# Patient Record
Sex: FEMALE | Race: WHITE | Employment: UNEMPLOYED | URBAN - METROPOLITAN AREA
[De-identification: names, ages, dates, MRNs, and addresses within clinical notes are randomized per-mention and may not be internally consistent; named-entity substitution may affect disease eponyms.]

---

## 2023-06-19 ENCOUNTER — OFFICE VISIT (OUTPATIENT)
Dept: ORTHOPEDIC SURGERY | Age: 78
End: 2023-06-19
Payer: MEDICARE

## 2023-06-19 VITALS — HEIGHT: 63 IN | BODY MASS INDEX: 20.38 KG/M2 | WEIGHT: 115 LBS

## 2023-06-19 DIAGNOSIS — M54.16 LUMBAR RADICULITIS: ICD-10-CM

## 2023-06-19 DIAGNOSIS — G89.29 CHRONIC BILATERAL LOW BACK PAIN WITH LEFT-SIDED SCIATICA: ICD-10-CM

## 2023-06-19 DIAGNOSIS — M54.42 CHRONIC BILATERAL LOW BACK PAIN WITH LEFT-SIDED SCIATICA: ICD-10-CM

## 2023-06-19 DIAGNOSIS — M51.36 DDD (DEGENERATIVE DISC DISEASE), LUMBAR: Primary | ICD-10-CM

## 2023-06-19 PROCEDURE — 99204 OFFICE O/P NEW MOD 45 MIN: CPT | Performed by: PHYSICIAN ASSISTANT

## 2023-06-19 PROCEDURE — 1123F ACP DISCUSS/DSCN MKR DOCD: CPT | Performed by: PHYSICIAN ASSISTANT

## 2023-06-19 RX ORDER — MELOXICAM 15 MG/1
15 TABLET ORAL DAILY PRN
Qty: 30 TABLET | Refills: 0 | Status: SHIPPED | OUTPATIENT
Start: 2023-06-19

## 2023-06-19 NOTE — PROGRESS NOTES
New Patient: Telma Choi    6/19/2023     CHIEF COMPLAINT:    Chief Complaint   Patient presents with    New Patient     LUMBAR/REF BY FRIEND       HISTORY OF PRESENT ILLNESS:              The patient is a 69 y.o. female history of breast cancer, depression, chronic low back pain here for worsening low back and radiating left leg pain. She reports chronic aching constant low back pain radiating into the left buttock, posterior thigh at times to the calf. Her left low back and buttock are most bothersome. Her pain is constant but increased with sitting, walking or resting. She reports some relief with changing position and standing. Conservative care includes physical therapy, NSAIDs, [facility], lumbar radiofrequency neurotomy without benefits. She denies any improvement feels symptoms are worsening. She currently denies any contralateral radiating pain. She denies any lower extremity numbness tingling or progressive weakness. No recent bowel or bladder dysfunction or saddle anesthesia. Currently denies any recent fevers chills or infections. No recent injury. The pain assessment was noted & reviewed in the medical record today.      Current/Past Treatment:   Physical Therapy: Yes  Chiropractic:     Injection:   Radiofrequency neurotomy with Dr. Manju Herrera  Medications:            NSAIDS: Yes            Muscle relaxer:              Steriods:              Neuropathic medications:              Opioids:            Other: Medicinal marijuana   Surgery/Consult:    Work Status/Functionality: Retired    Past Medical History: Medical history form was reviewed today & scanned into the media tab  Past Medical History:   Diagnosis Date    Cancer ([address]     breast    Chronic low back pain     Depression     Migraine     UTI     Vaginal infection     Wears glasses       Past Surgical History:     Past Surgical History:   Procedure Laterality Date    BREAST SURGERY      double mastectomy    EYE

## 2023-06-20 ENCOUNTER — TELEPHONE (OUTPATIENT)
Dept: ORTHOPEDIC SURGERY | Age: 78
End: 2023-06-20

## 2023-06-20 NOTE — TELEPHONE ENCOUNTER
Patient contacted regarding Lumbar MRI WO CONTRAST approval and authorization is valid. Patient was notified and instructed to call to schedule at [address] JOS[address] MIGUEL Valencia  Tel: [phone]      Once completed, patient was instructed on scheduling a follow up appoint to review results.  TEST RESULTS WILL NOT BE GIVEN OVER THE PHONE. AN IN-OFFICE APPOINTMENT IS REQUIRED

## 2023-06-23 ENCOUNTER — HOSPITAL ENCOUNTER (OUTPATIENT)
Dept: PHYSICAL THERAPY | Age: 78
Setting detail: THERAPIES SERIES
Discharge: HOME | End: 2023-06-23
Payer: MEDICARE

## 2023-06-23 PROCEDURE — 97161 PT EVAL LOW COMPLEX 20 MIN: CPT

## 2023-06-23 PROCEDURE — 97112 NEUROMUSCULAR REEDUCATION: CPT

## 2023-06-23 PROCEDURE — 97110 THERAPEUTIC EXERCISES: CPT

## 2023-06-23 NOTE — PROGRESS NOTES
[address] [place] [address] and [facility], [address], [address] [place] [address] [address]  Phone: [phone]   Fax: [phone]    Date: 2023          Patient Name; :  Anca Ornelas; 1945   Dx: Diagnosis: M51.36 (ICD-10-CM) - DDD (degenerative disc disease), lumbar  M54.16 (ICD-10-CM) - Lumbar radiculitis  M54.42, G89.29 (ICD-10-CM) - Chronic bilateral low back pain with left-sided sciatica      Physician:  Fred Martinez PA-C        Total PT Visits:      Measures Previous Current   Pain (0-10)     Disability %     ROM               Strength                 Specific Functional Improvements & Impressions:      Plan & Recommendations:  [] Continue rehabilitation due to objective improvement and continued functional deficits with frequency and duration:  [] Progress toward  []GAP, []Work Conditioning, []Independent HEP   [] Discharge due to   [] All goals achieved, [] Maximized \"medical necessity\" [] No subjective or objective improvements      Electronically signed by:  Fani Mckeon, PT  Therapy Plan of Care Re-Certification  This patient has been re-evaluated for physical therapy services and for therapy to continue, Medicare, Medicaid and other insurances require periodic physician review of the treatment plan. Please review the above re-evaluation and verify that you agree with plan of care as established above by signing the attached document and return it to our office or note changes to established plan below  [] Follow treatment plan as above [] Discontinue physical therapy  [] Change plan to:                                 __________________________________________________    Physician Signature:____________________________________ Date:____________  By signing above, therapists plan is approved by physician    If you have any questions or concerns, please don't hesitate to call.   Thank you for your referral.

## 2023-06-23 NOTE — FLOWSHEET NOTE
visit [] Discharge    Electronically signed by: Nishant Garcia, SPT; Yaniv Palma PT    Note: If patient does not return for scheduled/ recommended follow up visits, this note will serve as a discharge from care along with most recent update on progress.

## 2023-06-23 NOTE — PLAN OF CARE
[]Congenital spine pathologies   []Prior surgical intervention  [x]Osteoporosis (M81.8)  []Osteopenia (M85.8)   Endocrine conditions   []Hypothyroid (E03.9)  []Hyperthyroid Gastrointestinal conditions   []Constipation (Y69.68)   Metabolic conditions   []Morbid obesity (E66.01)  []Diabetes type 1(E10.65) or 2 (E11.65)   []Neuropathy (G60.9)     Pulmonary conditions   []Asthma (J45)  []Coughing   []COPD (J44.9)   Psychological Disorders  []Anxiety (F41.9)  []Depression (F32.9)   []Other:   []Other:           Barriers to/and or personal factors that will affect rehab potential:              []Age  []Sex              []Motivation/Lack of Motivation                        [x]Co-Morbidities              []Cognitive Function, education/learning barriers              []Environmental, home barriers              []profession/work barriers  []past PT/medical experience  []other:  Justification:     Falls Risk Assessment (30 days):   [x] Falls Risk assessed and no intervention required. [] Falls Risk assessed and Patient requires intervention due to being higher risk   TUG score (>12s at risk):     [] Falls education provided, including       G-Codes:       ASSESSMENT: Pt arrives to OPPT with 4/10 SIJ pain L>R which radiates to L gluteal region. Pt tender to palpation over L SIJ, (+) jasper finger sign. Pt's sxs reproduced when completing supine sciatic n glide. With v/c pt is independent with completing therex, in cognitive stage of motor learning. Pt demonstrates few compensatory movements while completing interventions this date. Pt's sxs and pain rating not increased after completing interventions this date.        Functional Impairments:     []Noted lumbar/proximal hip hypomobility   []Noted lumbosacral and/or generalized hypermobility   []Decreased Lumbosacral/hip/LE functional ROM   [x]Decreased core/proximal hip strength and neuromuscular control    []Decreased LE functional strength    []Abnormal

## 2023-06-27 ENCOUNTER — HOSPITAL ENCOUNTER (OUTPATIENT)
Dept: MRI IMAGING | Age: 78
Discharge: HOME | End: 2023-06-27
Payer: MEDICARE

## 2023-06-27 DIAGNOSIS — M54.42 CHRONIC BILATERAL LOW BACK PAIN WITH LEFT-SIDED SCIATICA: ICD-10-CM

## 2023-06-27 DIAGNOSIS — G89.29 CHRONIC BILATERAL LOW BACK PAIN WITH LEFT-SIDED SCIATICA: ICD-10-CM

## 2023-06-27 DIAGNOSIS — M54.16 LUMBAR RADICULITIS: ICD-10-CM

## 2023-06-27 DIAGNOSIS — M51.36 DDD (DEGENERATIVE DISC DISEASE), LUMBAR: ICD-10-CM

## 2023-06-27 PROCEDURE — 72148 MRI LUMBAR SPINE W/O DYE: CPT

## 2023-06-29 ENCOUNTER — HOSPITAL ENCOUNTER (OUTPATIENT)
Dept: PHYSICAL THERAPY | Age: 78
Setting detail: THERAPIES SERIES
Discharge: HOME | End: 2023-06-29
Payer: MEDICARE

## 2023-06-29 PROCEDURE — 97110 THERAPEUTIC EXERCISES: CPT

## 2023-06-29 PROCEDURE — 97112 NEUROMUSCULAR REEDUCATION: CPT

## 2023-07-05 ENCOUNTER — OFFICE VISIT (OUTPATIENT)
Dept: ORTHOPEDIC SURGERY | Age: 78
End: 2023-07-05
Payer: MEDICARE

## 2023-07-05 VITALS — WEIGHT: 117 LBS | HEIGHT: 63 IN | BODY MASS INDEX: 20.73 KG/M2

## 2023-07-05 DIAGNOSIS — G89.29 CHRONIC BILATERAL LOW BACK PAIN WITH LEFT-SIDED SCIATICA: ICD-10-CM

## 2023-07-05 DIAGNOSIS — M54.42 CHRONIC BILATERAL LOW BACK PAIN WITH LEFT-SIDED SCIATICA: ICD-10-CM

## 2023-07-05 DIAGNOSIS — M51.36 DDD (DEGENERATIVE DISC DISEASE), LUMBAR: ICD-10-CM

## 2023-07-05 DIAGNOSIS — M54.16 LUMBAR RADICULITIS: Primary | ICD-10-CM

## 2023-07-05 PROCEDURE — 1123F ACP DISCUSS/DSCN MKR DOCD: CPT | Performed by: PHYSICIAN ASSISTANT

## 2023-07-05 PROCEDURE — 99214 OFFICE O/P EST MOD 30 MIN: CPT | Performed by: PHYSICIAN ASSISTANT

## 2023-07-05 RX ORDER — IBANDRONATE SODIUM 150 MG/1
TABLET, FILM COATED ORAL
COMMUNITY
Start: 2023-06-30

## 2023-07-05 RX ORDER — SUMATRIPTAN 100 MG/1
100 TABLET, FILM COATED ORAL
COMMUNITY
Start: 2021-01-20

## 2023-07-05 NOTE — PROGRESS NOTES
FOLLOW UP: SPINE    7/5/2023     CHIEF COMPLAINT:    Chief Complaint   Patient presents with    Follow-up     MRI RESULTS LUMBAR SPINE       HISTORY OF PRESENT ILLNESS:              The patient is a 69 y.o. female history of breast cancer, depression, chronic low back pain here to review lumbar MRI for worsening low back and radiating left leg pain. She reports chronic aching constant low back pain radiating into the left buttock, posterior thigh at times to the calf. Her left low back and buttock are most bothersome. Her pain is constant but increased with sitting, walking or resting. She reports some relief with changing position and standing. Conservative care includes physical therapy, NSAIDs (mobic--no relief), [facility] evaluation, lumbar MBB which was nondiagnostic. She denies any improvement feels symptoms are worsening. She currently denies any contralateral radiating pain. She denies any lower extremity numbness tingling or progressive weakness. No recent bowel or bladder dysfunction or saddle anesthesia. Currently denies any recent fevers chills or infections. No recent injury. The pain assessment was noted & reviewed in the medical record today.      Current/Past Treatment:   Physical Therapy: Yes  Chiropractic:     Injection: MBB with Dr. Cristino Valles  Medications:            NSAIDS: Yes, Mobic without relief now taking ASA            Muscle relaxer:              Steriods:              Neuropathic medications:              Opioids:            Other: Medicinal marijuana   Surgery/Consult:    Work Status/Functionality: Retired    Past Medical History: Medical history form was reviewed today & scanned into the media tab  Past Medical History:   Diagnosis Date    Cancer ([address])     breast    Chronic low back pain     Depression     Migraine     UTI     Vaginal infection     Wears glasses       Past Surgical History:     Past Surgical History:   Procedure Laterality Date    BREAST

## 2023-07-07 ENCOUNTER — HOSPITAL ENCOUNTER (OUTPATIENT)
Dept: PHYSICAL THERAPY | Age: 78
Setting detail: THERAPIES SERIES
Discharge: HOME | End: 2023-07-07
Payer: MEDICARE

## 2023-07-07 PROCEDURE — 97110 THERAPEUTIC EXERCISES: CPT

## 2023-07-07 PROCEDURE — 97112 NEUROMUSCULAR REEDUCATION: CPT

## 2023-07-07 NOTE — FLOWSHEET NOTE
[facility] [address], [facility] [address]  Phone: [phone]   Fax:     [phone]      Physical Therapy Treatment Note/ Progress Report:       Date:  2023    Patient Name:  Milena Yuen    :  1945  MRN: [id number]  Restrictions/Precautions:    Medical/Treatment Diagnosis Information:  Diagnosis: M51.36 (ICD-10-CM) - DDD (degenerative disc disease), lumbar  M54.16 (ICD-10-CM) - Lumbar radiculitis  M54.42, G89.29 (ICD-10-CM) - Chronic bilateral low back pain with left-sided sciatica   LBP with movement dysfunction   Insurance/Certification information:  PT Insurance Information: [employer] Medicare $25 cpy/no auth/bmn  Physician Information:   Skye Pinedo PA-C  Has the plan of care been signed (Y/N):        []  Yes  [x]  No     Date of Patient follow up with Physician: 23    Is this a Progress Report:     []  Yes  [x]  No      If Yes:  Date Range for reporting period:  Beginnin23 ------------ Endin23    Progress report will be due (10 Rx or 30 days whichever is less): 3/73/80     Recertification will be due (POC Duration  / 90 days whichever is less): 23      Visit # Insurance Allowable Auth Required   In Person 3 ? []  Yes     []  No    Tele Health 0  []  Yes     []  No    Total 3       FOTO Score:   Oswestry Low Back Disability:     Date assessed:  23      Latex Allergy:  [x]NO      []YES  Preferred Language for Healthcare:   [x]English       []other:    Pain level:  pain rating not stated this date. Pain remains in lumbopelvic region    SUBJECTIVE:  Pt states low back pain has not changed, but radiating pain in posterior leg has decreased. OBJECTIVE:   Observation:   Test measurements:      RESTRICTIONS/PRECAUTIONS: h/o CA, Osteoporosis, OA, h/o knee scope L knee.      Exercises/Interventions:   Therapeutic Ex (48905) Sets/sec Reps Notes/CUES HEP          TABLE

## 2023-07-13 ENCOUNTER — HOSPITAL ENCOUNTER (OUTPATIENT)
Dept: PHYSICAL THERAPY | Age: 78
Setting detail: THERAPIES SERIES
Discharge: HOME | End: 2023-07-13
Payer: MEDICARE

## 2023-07-13 PROCEDURE — 97110 THERAPEUTIC EXERCISES: CPT

## 2023-07-13 PROCEDURE — G0283 ELEC STIM OTHER THAN WOUND: HCPCS

## 2023-07-13 PROCEDURE — 97112 NEUROMUSCULAR REEDUCATION: CPT

## 2023-07-13 NOTE — FLOWSHEET NOTE
[] Patient limited by fatigue  [] Patient limited by pain    [] Patient limited by other medical complications  [] Other:     Return to Play: (if applicable)   []  Stage 1: Intro to Strength   []  Stage 2: Return to Run and Strength   []  Stage 3: Return to Jump and Strength   []  Stage 4: Dynamic Strength and Agility   []  Stage 5: Sport Specific Training     []  Ready to Return to Play, Meets All Above Stages   []  Not Ready for Return to Sports   Comments:                           PLAN: See eval  [x] Continue per plan of care [] Alter current plan (see comments above)  [] Plan of care initiated [] Hold pending MD visit [] Discharge    Electronically signed by: Keke Mcnair, SPT; Alex Ortiz PT    Note: If patient does not return for scheduled/ recommended follow up visits, this note will serve as a discharge from care along with most recent update on progress.

## 2023-08-03 ENCOUNTER — HOSPITAL ENCOUNTER (OUTPATIENT)
Age: 78
Setting detail: OUTPATIENT SURGERY
Discharge: HOME | End: 2023-08-03
Attending: PAIN MEDICINE | Admitting: PAIN MEDICINE
Payer: MEDICARE

## 2023-08-03 VITALS
SYSTOLIC BLOOD PRESSURE: 121 MMHG | RESPIRATION RATE: 16 BRPM | WEIGHT: 140 LBS | HEIGHT: 63 IN | TEMPERATURE: 97.8 F | DIASTOLIC BLOOD PRESSURE: 71 MMHG | OXYGEN SATURATION: 100 % | BODY MASS INDEX: 24.8 KG/M2 | HEART RATE: 70 BPM

## 2023-08-03 PROBLEM — M54.16 LUMBAR RADICULOPATHY: Status: ACTIVE | Noted: 2023-08-03

## 2023-08-03 PROCEDURE — 2500000003 HC RX 250 WO HCPCS: Performed by: PAIN MEDICINE

## 2023-08-03 PROCEDURE — 3600000002 HC SURGERY LEVEL 2 BASE: Performed by: PAIN MEDICINE

## 2023-08-03 PROCEDURE — 2709999900 HC NON-CHARGEABLE SUPPLY: Performed by: PAIN MEDICINE

## 2023-08-03 PROCEDURE — 6370000000 HC RX 637 (ALT 250 FOR IP): Performed by: PAIN MEDICINE

## 2023-08-03 PROCEDURE — 6360000004 HC RX CONTRAST MEDICATION: Performed by: PAIN MEDICINE

## 2023-08-03 PROCEDURE — 7100000010 HC PHASE II RECOVERY - FIRST 15 MIN: Performed by: PAIN MEDICINE

## 2023-08-03 PROCEDURE — 6360000002 HC RX W HCPCS: Performed by: PAIN MEDICINE

## 2023-08-03 PROCEDURE — 62323 NJX INTERLAMINAR LMBR/SAC: CPT | Performed by: PAIN MEDICINE

## 2023-08-03 RX ORDER — LIDOCAINE HYDROCHLORIDE 10 MG/ML
INJECTION, SOLUTION EPIDURAL; INFILTRATION; INTRACAUDAL; PERINEURAL PRN
Status: DISCONTINUED | OUTPATIENT
Start: 2023-08-03 | End: 2023-08-03 | Stop reason: ALTCHOICE

## 2023-08-03 RX ORDER — BETAMETHASONE SODIUM PHOSPHATE AND BETAMETHASONE ACETATE 3; 3 MG/ML; MG/ML
INJECTION, SUSPENSION INTRA-ARTICULAR; INTRALESIONAL; INTRAMUSCULAR; SOFT TISSUE
Status: DISCONTINUED
Start: 2023-08-03 | End: 2023-08-03 | Stop reason: HOSPADM

## 2023-08-03 RX ORDER — MIDAZOLAM HYDROCHLORIDE 2 MG/ML
2.5 SYRUP ORAL ONCE
Status: COMPLETED | OUTPATIENT
Start: 2023-08-03 | End: 2023-08-03

## 2023-08-03 RX ADMIN — MIDAZOLAM HYDROCHLORIDE 2.5 MG: 2 SYRUP ORAL at 11:47

## 2023-08-03 ASSESSMENT — PAIN - FUNCTIONAL ASSESSMENT
PAIN_FUNCTIONAL_ASSESSMENT: 0-10
PAIN_FUNCTIONAL_ASSESSMENT: PREVENTS OR INTERFERES WITH MANY ACTIVE NOT PASSIVE ACTIVITIES

## 2023-08-03 ASSESSMENT — PAIN DESCRIPTION - DESCRIPTORS: DESCRIPTORS: ACHING

## 2023-08-03 NOTE — DISCHARGE INSTRUCTIONS
[address]    [phone]    Post Pain Management Injection    PATIENT INSTRUCTIONS:     -Resume Normal Diet  -Other    ACTIVITY:    -No driving or operating machinery for 8 hours post procedure without sedation and 24 hours with sedation. If you are seen driving during this time the proper authorities will be notified.  -Do not stay alone for 4-6 hours after the procedure.  -If you have had IV sedation, do not sign legal documents, make any major decisions, or be involved in work decisions for the remainder of the day. -May shower or bathe.  -Resume normal activity when full movement/sensation has returned in extremities. 3)  SITE CARE:    -Observe puncture site for signs of infection (redness, warmth swelling, drainage with a foul odor, fever or increased tenderness). 4)  EXPECTED SIDE EFFECTS:    -Numbness/tingling/weakness in extremities, if this lasts more than 6 hours notify Dr. Oumar Polanco. -Muscle stiffness, soreness at puncture site (soreness may last 2-4 days). DIABETIC PATIENTS ONLY:    -Increased glucose levels in all diabetic patients who have received a steroid injection. -Monitor blood sugars frequently for the first 5 days following procedure.      -Adjust medication accordingly. 6)  TO REACH DR. Oumar Polanco: Call [phone]    ADDITIONAL INSTRUCTIONS:    Follow-up as scheduled or call for appointment if not already done. Patients taking Coumadin may resume taking as before the procedure.

## 2023-08-21 ENCOUNTER — OFFICE VISIT (OUTPATIENT)
Dept: ORTHOPEDIC SURGERY | Age: 78
End: 2023-08-21
Payer: MEDICARE

## 2023-08-21 VITALS — WEIGHT: 140 LBS | HEIGHT: 63 IN | BODY MASS INDEX: 24.8 KG/M2

## 2023-08-21 DIAGNOSIS — M51.36 DDD (DEGENERATIVE DISC DISEASE), LUMBAR: ICD-10-CM

## 2023-08-21 DIAGNOSIS — G89.29 CHRONIC BILATERAL LOW BACK PAIN WITH LEFT-SIDED SCIATICA: ICD-10-CM

## 2023-08-21 DIAGNOSIS — M54.16 LUMBAR RADICULITIS: Primary | ICD-10-CM

## 2023-08-21 DIAGNOSIS — M54.42 CHRONIC BILATERAL LOW BACK PAIN WITH LEFT-SIDED SCIATICA: ICD-10-CM

## 2023-08-21 PROCEDURE — 99214 OFFICE O/P EST MOD 30 MIN: CPT | Performed by: PHYSICIAN ASSISTANT

## 2023-08-21 PROCEDURE — 1123F ACP DISCUSS/DSCN MKR DOCD: CPT | Performed by: PHYSICIAN ASSISTANT

## 2023-08-21 NOTE — PROGRESS NOTES
worsening LBP  2) Chronic left lumbar radiculitis--improved  3) L5-S1 DDD w/FA and mod-severe left FS  4) H/o MBB--Dr. Elvira La, no relief, non dx  5) H/o breast cancer  6) MM      Plan:   1) We discussed treatment options. She wishes to proceed with left L5-S1 LESI #2. Procedure risk and benefits discussed.   We will order again with Dr. Munira Cheema    2) Cont HEP    4) Cont ASA, d/c for SAM    5) Discussed concerning signs and symptoms    6) F/u 2wks after [address], PA-C, MPAS  Board Certified by the [address] [place] [address]

## 2023-10-12 ENCOUNTER — HOSPITAL ENCOUNTER (OUTPATIENT)
Age: 78
Setting detail: OUTPATIENT SURGERY
Discharge: HOME | End: 2023-10-12
Attending: PAIN MEDICINE | Admitting: PAIN MEDICINE
Payer: MEDICARE

## 2023-10-12 VITALS
WEIGHT: 140 LBS | OXYGEN SATURATION: 96 % | BODY MASS INDEX: 24.8 KG/M2 | SYSTOLIC BLOOD PRESSURE: 146 MMHG | RESPIRATION RATE: 16 BRPM | HEART RATE: 86 BPM | DIASTOLIC BLOOD PRESSURE: 69 MMHG | HEIGHT: 63 IN | TEMPERATURE: 98.4 F

## 2023-10-12 PROCEDURE — 7100000010 HC PHASE II RECOVERY - FIRST 15 MIN: Performed by: PAIN MEDICINE

## 2023-10-12 PROCEDURE — 2500000003 HC RX 250 WO HCPCS: Performed by: PAIN MEDICINE

## 2023-10-12 PROCEDURE — 3600000002 HC SURGERY LEVEL 2 BASE: Performed by: PAIN MEDICINE

## 2023-10-12 PROCEDURE — 6360000004 HC RX CONTRAST MEDICATION: Performed by: PAIN MEDICINE

## 2023-10-12 PROCEDURE — 62323 NJX INTERLAMINAR LMBR/SAC: CPT | Performed by: PAIN MEDICINE

## 2023-10-12 PROCEDURE — 6360000002 HC RX W HCPCS: Performed by: PAIN MEDICINE

## 2023-10-12 PROCEDURE — 2709999900 HC NON-CHARGEABLE SUPPLY: Performed by: PAIN MEDICINE

## 2023-10-12 PROCEDURE — 6370000000 HC RX 637 (ALT 250 FOR IP)

## 2023-10-12 RX ORDER — MIDAZOLAM HYDROCHLORIDE 2 MG/ML
SYRUP ORAL
Status: COMPLETED
Start: 2023-10-12 | End: 2023-10-12

## 2023-10-12 RX ORDER — LIDOCAINE HYDROCHLORIDE 10 MG/ML
INJECTION, SOLUTION EPIDURAL; INFILTRATION; INTRACAUDAL; PERINEURAL PRN
Status: DISCONTINUED | OUTPATIENT
Start: 2023-10-12 | End: 2023-10-12 | Stop reason: ALTCHOICE

## 2023-10-12 RX ORDER — MIDAZOLAM HYDROCHLORIDE 2 MG/ML
2.5 SYRUP ORAL ONCE
Status: DISCONTINUED | OUTPATIENT
Start: 2023-10-12 | End: 2023-10-12

## 2023-10-12 RX ADMIN — MIDAZOLAM HYDROCHLORIDE 2.5 MG: 2 SYRUP ORAL at 14:20

## 2023-10-12 ASSESSMENT — PAIN SCALES - GENERAL: PAINLEVEL_OUTOF10: 5

## 2023-10-12 ASSESSMENT — PAIN - FUNCTIONAL ASSESSMENT
PAIN_FUNCTIONAL_ASSESSMENT: 0-10
PAIN_FUNCTIONAL_ASSESSMENT: ACTIVITIES ARE NOT PREVENTED

## 2023-10-12 ASSESSMENT — PAIN DESCRIPTION - DESCRIPTORS: DESCRIPTORS: ACHING

## 2023-10-12 NOTE — DISCHARGE INSTRUCTIONS
[address]    [phone]    Post Pain Management Injection    PATIENT INSTRUCTIONS:     -Resume Normal Diet  -Other    ACTIVITY:    -No driving or operating machinery for 8 hours post procedure without sedation and 24 hours with sedation. If you are seen driving during this time the proper authorities will be notified.  -Do not stay alone for 4-6 hours after the procedure.  -If you have had IV sedation, do not sign legal documents, make any major decisions, or be involved in work decisions for the remainder of the day. -May shower or bathe.  -Resume normal activity when full movement/sensation has returned in extremities. 3)  SITE CARE:    -Observe puncture site for signs of infection (redness, warmth swelling, drainage with a foul odor, fever or increased tenderness). 4)  EXPECTED SIDE EFFECTS:    -Numbness/tingling/weakness in extremities, if this lasts more than 6 hours notify Dr. Cali Wu. -Muscle stiffness, soreness at puncture site (soreness may last 2-4 days). DIABETIC PATIENTS ONLY:    -Increased glucose levels in all diabetic patients who have received a steroid injection. -Monitor blood sugars frequently for the first 5 days following procedure.      -Adjust medication accordingly. 6)  TO REACH DR. Cali Wu: Call [phone]    ADDITIONAL INSTRUCTIONS:    Follow-up as scheduled or call for appointment if not already done. Patients taking Coumadin may resume taking as before the procedure.

## 2023-10-30 ENCOUNTER — OFFICE VISIT (OUTPATIENT)
Dept: ORTHOPEDIC SURGERY | Age: 78
End: 2023-10-30
Payer: MEDICARE

## 2023-10-30 VITALS — HEIGHT: 63 IN | WEIGHT: 140 LBS | BODY MASS INDEX: 24.8 KG/M2

## 2023-10-30 DIAGNOSIS — M54.16 LUMBAR RADICULITIS: Primary | ICD-10-CM

## 2023-10-30 DIAGNOSIS — M51.36 DDD (DEGENERATIVE DISC DISEASE), LUMBAR: ICD-10-CM

## 2023-10-30 DIAGNOSIS — G89.29 CHRONIC BILATERAL LOW BACK PAIN WITH LEFT-SIDED SCIATICA: ICD-10-CM

## 2023-10-30 DIAGNOSIS — M54.42 CHRONIC BILATERAL LOW BACK PAIN WITH LEFT-SIDED SCIATICA: ICD-10-CM

## 2023-10-30 PROCEDURE — 1123F ACP DISCUSS/DSCN MKR DOCD: CPT | Performed by: PHYSICIAN ASSISTANT

## 2023-10-30 PROCEDURE — 99212 OFFICE O/P EST SF 10 MIN: CPT | Performed by: PHYSICIAN ASSISTANT

## 2023-10-30 RX ORDER — TRAZODONE HYDROCHLORIDE 50 MG/1
TABLET ORAL
COMMUNITY
Start: 2023-10-23

## 2023-10-30 NOTE — PROGRESS NOTES
FOLLOW UP: SPINE    10/30/2023     CHIEF COMPLAINT:    Chief Complaint   Patient presents with    Follow Up After Procedure     10/12/2023 LUMBAR INTERLAMINAR EPIDURAL INJECTION AT M L5-S1 Thomas Shepard MD       HISTORY OF PRESENT ILLNESS:              The patient is a 69 y.o. female history of breast cancer, depression, chronic low back pain here to follow-up after M L5-S1 LESI #2 10/12/2023 for worsening low back and radiating left leg pain. She reports chronic aching constant low back pain radiating into the left buttock, posterior thigh at times to the calf. Her left low back and buttock were most bothersome. Her pain was constant but increased with sitting, walking or resting. She reports some relief with changing position and standing. She currently reports 65% improvement with improved functionality since the procedure. She is back to playing [employer] ball. Other conservative care includes physical therapy, NSAIDs (mobic--no relief), [place] orth[facility] evaluation, lumbar MBB which was nondiagnostic. She currently denies any distal radiating pain. She denies any lower extremity numbness tingling or progressive weakness. No recent bowel or bladder dysfunction or saddle anesthesia. Currently denies any recent fevers chills or infections. Denies any side effects the procedure. The pain assessment was noted & reviewed in the medical record today.      Current/Past Treatment:   Physical Therapy: Yes  Chiropractic:     Injection: MBB with Dr. Chloe John, non DX  8/3/2023 LUMBAR INTERLAMINAR EPIDURAL INJECTION AT LEFT L5-S1 - Marcus Alberto MD---60% IMPROVED  10/12/2023 LUMBAR INTERLAMINAR EPIDURAL INJECTION AT M L5-S1 Marcus Alberto MD--65%  Medications:            NSAIDS: Yes, Mobic without relief now taking ASA            Muscle relaxer:              Steriods:              Neuropathic medications:              Opioids:            Other: Medicinal marijuana   Surgery/Consult:    Work

## 2024-03-26 ENCOUNTER — OFFICE VISIT (OUTPATIENT)
Dept: ORTHOPEDIC SURGERY | Age: 79
End: 2024-03-26
Payer: MEDICARE

## 2024-03-26 ENCOUNTER — TELEPHONE (OUTPATIENT)
Dept: ORTHOPEDIC SURGERY | Age: 79
End: 2024-03-26

## 2024-03-26 VITALS — HEIGHT: 63 IN | BODY MASS INDEX: 24.8 KG/M2 | WEIGHT: 140 LBS

## 2024-03-26 DIAGNOSIS — G89.29 CHRONIC BILATERAL LOW BACK PAIN WITH LEFT-SIDED SCIATICA: ICD-10-CM

## 2024-03-26 DIAGNOSIS — M86.9 OSTEITIS PUBIS (HCC): ICD-10-CM

## 2024-03-26 DIAGNOSIS — M51.36 BULGING LUMBAR DISC: ICD-10-CM

## 2024-03-26 DIAGNOSIS — M54.42 CHRONIC BILATERAL LOW BACK PAIN WITH LEFT-SIDED SCIATICA: ICD-10-CM

## 2024-03-26 DIAGNOSIS — M51.36 DDD (DEGENERATIVE DISC DISEASE), LUMBAR: Primary | ICD-10-CM

## 2024-03-26 PROCEDURE — 99214 OFFICE O/P EST MOD 30 MIN: CPT | Performed by: PHYSICIAN ASSISTANT

## 2024-03-26 PROCEDURE — 1123F ACP DISCUSS/DSCN MKR DOCD: CPT | Performed by: PHYSICIAN ASSISTANT

## 2024-03-26 RX ORDER — TIZANIDINE 2 MG/1
2 TABLET ORAL NIGHTLY PRN
Qty: 15 TABLET | Refills: 0 | Status: SHIPPED | OUTPATIENT
Start: 2024-03-26 | End: 2024-04-10

## 2024-03-26 NOTE — PROGRESS NOTES
FOLLOW UP: SPINE    3/26/2024     CHIEF COMPLAINT:    Chief Complaint   Patient presents with    Follow-up     LUMBAR SPINE       HISTORY OF PRESENT ILLNESS:              The patient is a 78 y.o. female history of breast cancer, depression, chronic low back pain here to follow-up for recurrent low back pain.  She reports 2 month h/o increased aching low back/left buttock pain. Her pain is episodic but increased with prolonged inactivity such as sitting or resting.  She reports some relief with changing position and standing.  She reports relief with prior SAM's last in October 2023.  She feels these are the same symptoms amendable to SAM and is requesting repeat injection.  She is generally very active and still playing [employer].  Other conservative care includes physical therapy, NSAIDs (mobic--no relief), [place] [facility] evaluation, lumbar MBB which was nondiagnostic.  She currently denies any distal radiating pain.  She denies any lower extremity numbness tingling or progressive weakness.  No recent bowel or bladder dysfunction or saddle anesthesia.  Currently denies any recent fevers chills or infections.  No recent injury or trauma.    The pain assessment was noted & reviewed in the medical record today.     Current/Past Treatment:   Physical Therapy: Yes  Chiropractic:     Injection: MBB with Dr. Nj, non DX  8/3/2023 LUMBAR INTERLAMINAR EPIDURAL INJECTION AT LEFT L5-S1 - Marcus Alberto MD---60% IMPROVED  10/12/2023 LUMBAR INTERLAMINAR EPIDURAL INJECTION AT M L5-S1 Marcus Alberto MD--65%  Medications:            NSAIDS: Yes, Mobic without relief now taking ASA            Muscle relaxer:              Steriods:              Neuropathic medications:              Opioids:            Other: Medicinal marijuana   Surgery/Consult:    Work Status/Functionality: Retired, very active    Past Medical History: Medical history form was reviewed today & scanned into the media tab  Past Medical History:

## 2024-03-26 NOTE — TELEPHONE ENCOUNTER
Contacted the patient regarding medication we are sending to the pharmacy. Patient was instructed to not take the Zanaflex and the Excedrin together, there could be an interaction between the two If taken together. Patient voiced understanding.

## 2024-03-27 ENCOUNTER — TELEPHONE (OUTPATIENT)
Dept: ORTHOPEDIC SURGERY | Age: 79
End: 2024-03-27

## 2024-03-27 NOTE — TELEPHONE ENCOUNTER
Left a voicemail for Allyson Kelly  regarding MRI PELVIS WO CONTRAST approval and authorization being valid until 9/22/2024.  Patient was instructed that their MRI needs to be scheduled at [facility]. The patient was instructed to contact the facility to schedule  at [phone].    Patient provided mainline to schedule F/U appointment if the appointment has not been made yet.

## 2024-04-02 ENCOUNTER — HOSPITAL ENCOUNTER (OUTPATIENT)
Dept: MRI IMAGING | Age: 79
Discharge: HOME | End: 2024-04-02
Payer: MEDICARE

## 2024-04-02 DIAGNOSIS — M86.9 OSTEITIS PUBIS (HCC): ICD-10-CM

## 2024-04-02 PROCEDURE — 72195 MRI PELVIS W/O DYE: CPT

## 2024-04-17 ENCOUNTER — OFFICE VISIT (OUTPATIENT)
Dept: ORTHOPEDIC SURGERY | Age: 79
End: 2024-04-17
Payer: MEDICARE

## 2024-04-17 VITALS — WEIGHT: 140 LBS | BODY MASS INDEX: 24.8 KG/M2 | HEIGHT: 63 IN

## 2024-04-17 DIAGNOSIS — G89.29 CHRONIC BILATERAL LOW BACK PAIN WITH LEFT-SIDED SCIATICA: ICD-10-CM

## 2024-04-17 DIAGNOSIS — M54.16 LUMBAR RADICULITIS: ICD-10-CM

## 2024-04-17 DIAGNOSIS — M54.42 CHRONIC BILATERAL LOW BACK PAIN WITH LEFT-SIDED SCIATICA: ICD-10-CM

## 2024-04-17 DIAGNOSIS — M51.36 DDD (DEGENERATIVE DISC DISEASE), LUMBAR: Primary | ICD-10-CM

## 2024-04-17 PROCEDURE — 1123F ACP DISCUSS/DSCN MKR DOCD: CPT | Performed by: PHYSICIAN ASSISTANT

## 2024-04-17 PROCEDURE — 99214 OFFICE O/P EST MOD 30 MIN: CPT | Performed by: PHYSICIAN ASSISTANT

## 2024-04-17 NOTE — PROGRESS NOTES
FOLLOW UP: SPINE    4/17/2024     CHIEF COMPLAINT:    Chief Complaint   Patient presents with    Follow-up     MRI RESULTS PELVIS       HISTORY OF PRESENT ILLNESS:              The patient is a 78 y.o. female history of breast cancer, depression, chronic low back pain here to review MRI pelvis for back and leg pain. She reports a 2.5 month h/o increased aching low back/left buttock pain. Her pain is episodic but increased with prolonged inactivity such as sitting or resting.  She reports some relief with changing position and standing.  She reports relief with prior SAM's last in October 2023.  She feels these are the same symptoms amendable to SAM.  She is generally very active and still playing [employer].  Other conservative care includes physical therapy, NSAIDs (mobic--no relief), [place] orthopedics evaluation, lumbar MBB which was nondiagnostic.  She currently reports improvement overall.  She feels pain is very tolerable and currently wishing to hold off on repeat SAM.  She currently denies any distal radiating pain.  She denies any lower extremity numbness tingling or progressive weakness.  No recent bowel or bladder dysfunction or saddle anesthesia.  Currently denies any recent fevers chills or infections.  No recent injury or trauma.  Overall much improved.    The pain assessment was noted & reviewed in the medical record today.     Current/Past Treatment:   Physical Therapy: Yes  Chiropractic:     Injection: MBB with Dr. Nj, non DX  8/3/2023 LUMBAR INTERLAMINAR EPIDURAL INJECTION AT LEFT L5-S1 - Marcus Alberto MD---60% IMPROVED  10/12/2023 LUMBAR INTERLAMINAR EPIDURAL INJECTION AT M L5-S1 Marcus Alberto MD--65%  Medications:            NSAIDS: Yes, Mobic without relief now taking ASA            Muscle relaxer:              Steriods:              Neuropathic medications:              Opioids:            Other: Medicinal marijuana   Surgery/Consult:    Work Status/Functionality: Retired, very

## 2024-12-27 ENCOUNTER — HOSPITAL ENCOUNTER (OUTPATIENT)
Dept: INTERVENTIONAL RADIOLOGY/VASCULAR | Age: 79
Discharge: HOME | End: 2024-12-29
Payer: MEDICARE

## 2024-12-27 VITALS
SYSTOLIC BLOOD PRESSURE: 130 MMHG | DIASTOLIC BLOOD PRESSURE: 67 MMHG | OXYGEN SATURATION: 98 % | HEART RATE: 72 BPM | RESPIRATION RATE: 14 BRPM

## 2024-12-27 DIAGNOSIS — M51.360 DISCOGENIC SYNDROME, LUMBAR: ICD-10-CM

## 2024-12-27 PROCEDURE — 6360000002 HC RX W HCPCS: Performed by: RADIOLOGY

## 2024-12-27 PROCEDURE — 2709999900 HC NON-CHARGEABLE SUPPLY

## 2024-12-27 PROCEDURE — 6360000004 HC RX CONTRAST MEDICATION: Performed by: RADIOLOGY

## 2024-12-27 PROCEDURE — 62323 NJX INTERLAMINAR LMBR/SAC: CPT

## 2024-12-27 PROCEDURE — 6370000000 HC RX 637 (ALT 250 FOR IP): Performed by: RADIOLOGY

## 2024-12-27 RX ORDER — IOPAMIDOL 408 MG/ML
INJECTION, SOLUTION INTRATHECAL PRN
Status: COMPLETED | OUTPATIENT
Start: 2024-12-27 | End: 2024-12-27

## 2024-12-27 RX ORDER — LIDOCAINE HYDROCHLORIDE 10 MG/ML
INJECTION, SOLUTION EPIDURAL; INFILTRATION; INTRACAUDAL; PERINEURAL PRN
Status: COMPLETED | OUTPATIENT
Start: 2024-12-27 | End: 2024-12-27

## 2024-12-27 RX ORDER — TRIAMCINOLONE ACETONIDE 40 MG/ML
INJECTION, SUSPENSION INTRA-ARTICULAR; INTRAMUSCULAR PRN
Status: COMPLETED | OUTPATIENT
Start: 2024-12-27 | End: 2024-12-27

## 2024-12-27 RX ORDER — BUPIVACAINE HYDROCHLORIDE 2.5 MG/ML
INJECTION, SOLUTION EPIDURAL; INFILTRATION; INTRACAUDAL PRN
Status: COMPLETED | OUTPATIENT
Start: 2024-12-27 | End: 2024-12-27

## 2024-12-27 RX ORDER — MIDAZOLAM HYDROCHLORIDE 2 MG/ML
5 SYRUP ORAL ONCE
Status: COMPLETED | OUTPATIENT
Start: 2024-12-27 | End: 2024-12-27

## 2024-12-27 RX ADMIN — LIDOCAINE HYDROCHLORIDE 10 ML: 10 INJECTION, SOLUTION EPIDURAL; INFILTRATION; INTRACAUDAL; PERINEURAL at 12:10

## 2024-12-27 RX ADMIN — MIDAZOLAM HYDROCHLORIDE 5 MG: 2 SYRUP ORAL at 11:36

## 2024-12-27 RX ADMIN — BUPIVACAINE HYDROCHLORIDE 4 ML: 2.5 INJECTION, SOLUTION EPIDURAL; INFILTRATION; INTRACAUDAL at 12:13

## 2024-12-27 RX ADMIN — TRIAMCINOLONE ACETONIDE 80 MG: 40 INJECTION, SUSPENSION INTRA-ARTICULAR; INTRAMUSCULAR at 12:13

## 2024-12-27 RX ADMIN — IOPAMIDOL 2 ML: 408 INJECTION, SOLUTION INTRATHECAL at 12:11

## 2024-12-27 NOTE — H&P
Patient:  Allyson Kelly   :   1945      Relevant clinical history, particularly as it involves the pending procedure, was reviewed and discussed.    The procedure including risks and benefits was discussed at length with the patient (or designated family member) and all questions were answered.  Informed consent to proceed with the procedure was given.    Vital signs were monitored and documented by the Radiology nurse.    Targeted physical examination  Heart : regular rate and rhythm  Lungs : clear, breathing easily  Condition : stable    [facility] nurses notes reviewed and agreed.    Past Medical History:        Diagnosis Date    Cancer (HCC)     breast    Chronic low back pain     Depression     Migraine     UTI     Vaginal infection     Wears glasses        Past Surgical History:           Procedure Laterality Date    BREAST SURGERY      double mastectomy    EYE SURGERY      left eye cataract w/ IOL    PAIN MANAGEMENT PROCEDURE Left 8/3/2023    LEFT LUMBAR FIVE SACRAL ONE EPIDURAL STEROID INJECTION SITE CONFIRMED BY FLUOROSCOPY performed by Marcus Alberto MD at [facility]    PAIN MANAGEMENT PROCEDURE Left 10/12/2023    LEFT LUMBAR FIVE SACRAL ONE EPIDURAL STEROID INJECTION SITE CONFIRMED BY FLUOROSCOPY performed by Marcus Alberto MD at [facility]    UPPER GASTROINTESTINAL ENDOSCOPY  10/03/2016       Allergies:  Patient has no known allergies.    Medications:   Home Meds  Current Outpatient Medications on File Prior to Encounter   Medication Sig Dispense Refill    ibandronate (BONIVA) 150 MG tablet       Aspirin-Acetaminophen-Caffeine (EXCEDRIN PO) Take  by mouth daily.       No current facility-administered medications on file prior to encounter.       Current Meds  No current facility-administered medications for this encounter.        ASA 2 - Patient with mild systemic disease with no functional limitations    II (soft palate, uvula, fauces visible)    Activity:  2 - Able to move 4

## 2025-02-06 ENCOUNTER — OFFICE VISIT (OUTPATIENT)
Age: 80
End: 2025-02-06

## 2025-02-06 VITALS
OXYGEN SATURATION: 96 % | DIASTOLIC BLOOD PRESSURE: 62 MMHG | WEIGHT: 140 LBS | HEART RATE: 93 BPM | HEIGHT: 63 IN | BODY MASS INDEX: 24.8 KG/M2 | SYSTOLIC BLOOD PRESSURE: 100 MMHG | TEMPERATURE: 97.3 F

## 2025-02-06 DIAGNOSIS — J01.90 ACUTE NON-RECURRENT SINUSITIS, UNSPECIFIED LOCATION: Primary | ICD-10-CM

## 2025-02-06 RX ORDER — DOXYCYCLINE HYCLATE 100 MG
100 TABLET ORAL 2 TIMES DAILY
Qty: 20 TABLET | Refills: 0 | Status: SHIPPED | OUTPATIENT
Start: 2025-02-06 | End: 2025-02-16

## 2025-02-06 NOTE — PATIENT INSTRUCTIONS
Keep hydrated, tylenol or ibuprofen (if no contraindications) as needed if pain or fever.. Take medications as prescribed..  Can use over the counter flonase nasal spray and once a day Zyrtec for congestion...  follow up with PCP in 7- days if not better   Return or go to ER  if symptoms worse/feeling worse or has new symptoms or concerns,  if fever/chills, increase shortness of breath, increase congestion or wheezing   if associated with chest pain, nausea/vomiting, dizzy/ light-headed sensation.

## 2025-02-06 NOTE — PROGRESS NOTES
Allyson Kelly (:  1945) is a 79 y.o. female,New patient, here for evaluation of the following chief complaint(s):  Sinusitis (Sinus problems, started 2 weeks ago)      ASSESSMENT/PLAN:    ICD-10-CM    1. Acute non-recurrent sinusitis, unspecified location  J01.90 doxycycline hyclate (VIBRA-TABS) 100 MG tablet         Due to duration of symptoms will see if resolves with course of antibiotic    Keep hydrated, tylenol or ibuprofen (if no contraindications) as needed if pain or fever.. Take medications as prescribed..  Can use over the counter flonase nasal spray and once a day Zyrtec for congestion...  follow up with PCP in 7- days if not better   Return or go to ER  if symptoms worse/feeling worse or has new symptoms or concerns,  if fever/chills, increase shortness of breath, increase congestion or wheezing   if associated with chest pain, nausea/vomiting, dizzy/ light-headed sensation.         SUBJECTIVE/OBJECTIVE:  Patient presents with:  Sinusitis: Sinus problems, started 2 weeks ago         History provided by:  Patient   used: No    Sinusitis  Associated symptoms include congestion and sinus pressure. Pertinent negatives include no coughing, headaches or sore throat.       Vitals:    25 0928   BP: 100/62   Pulse: 93   Temp: 97.3 °F (36.3 °C)   TempSrc: Temporal   SpO2: 96%   Weight: 63.5 kg (140 lb)   Height: 1.6 m (5' 3\")       Review of Systems   Constitutional:  Negative for fever.   HENT:  Positive for congestion, sinus pressure and sinus pain. Negative for rhinorrhea and sore throat.    Respiratory:  Negative for cough.    Gastrointestinal:  Negative for nausea and vomiting.   Neurological:  Negative for headaches.       Physical Exam    Physical  Vitals signs: reviewed  Constitutional:  appearance: well nourished ..  does not appear acutely ill  ..no distress   Eyes:                 Pupil: equal-round-reactive to light, no photophobia, EOMI            Cornea: clear

## 2025-02-08 ASSESSMENT — ENCOUNTER SYMPTOMS
NAUSEA: 0
SORE THROAT: 0
RHINORRHEA: 0
SINUS PAIN: 1
SINUS PRESSURE: 1
COUGH: 0
VOMITING: 0

## (undated) DEVICE — Z INACTIVE USE 2855128 SPONGE GZ 16 PLY WVN COT 4INX4IN  HHH

## (undated) DEVICE — APPLICATOR PREP 26ML 0.7% IOD POVACRYLEX 74% ISO ALC ST

## (undated) DEVICE — UNIVERSAL BLOCK TRAY: Brand: MEDLINE INDUSTRIES, INC.

## (undated) DEVICE — ALCOHOL RUBBING 16OZ 70% ISO

## (undated) DEVICE — TOWEL OR BLUEE 16X26IN ST 8 PACK ORB08 16X26ORTWL

## (undated) DEVICE — STERILE POLYISOPRENE POWDER-FREE SURGICAL GLOVES: Brand: PROTEXIS